# Patient Record
Sex: MALE | Race: WHITE | NOT HISPANIC OR LATINO
[De-identification: names, ages, dates, MRNs, and addresses within clinical notes are randomized per-mention and may not be internally consistent; named-entity substitution may affect disease eponyms.]

---

## 2019-01-10 ENCOUNTER — APPOINTMENT (OUTPATIENT)
Dept: FAMILY MEDICINE | Facility: CLINIC | Age: 59
End: 2019-01-10
Payer: SELF-PAY

## 2019-01-10 VITALS
SYSTOLIC BLOOD PRESSURE: 112 MMHG | BODY MASS INDEX: 26.21 KG/M2 | OXYGEN SATURATION: 98 % | HEIGHT: 67 IN | WEIGHT: 167 LBS | DIASTOLIC BLOOD PRESSURE: 78 MMHG | HEART RATE: 68 BPM

## 2019-01-10 DIAGNOSIS — Z02.4 ENCOUNTER FOR EXAMINATION FOR DRIVING LICENSE: ICD-10-CM

## 2019-01-10 PROBLEM — Z00.00 ENCOUNTER FOR PREVENTIVE HEALTH EXAMINATION: Status: ACTIVE | Noted: 2019-01-10

## 2019-01-10 LAB
BILIRUB UR QL STRIP: NEGATIVE
CLARITY UR: CLEAR
COLLECTION METHOD: NORMAL
GLUCOSE UR-MCNC: NEGATIVE
HCG UR QL: 0.2 EU/DL
HGB UR QL STRIP.AUTO: NEGATIVE
KETONES UR-MCNC: NEGATIVE
LEUKOCYTE ESTERASE UR QL STRIP: NEGATIVE
NITRITE UR QL STRIP: NEGATIVE
PH UR STRIP: 6.5
PROT UR STRIP-MCNC: NEGATIVE
SP GR UR STRIP: 1.01

## 2019-01-10 PROCEDURE — 99173 VISUAL ACUITY SCREEN: CPT

## 2019-01-10 PROCEDURE — 99499P DOT PHYSICAL: CUSTOM | Mod: 25

## 2019-01-10 PROCEDURE — 81003 URINALYSIS AUTO W/O SCOPE: CPT | Mod: QW

## 2019-01-10 PROCEDURE — 92551 PURE TONE HEARING TEST AIR: CPT

## 2020-11-11 ENCOUNTER — APPOINTMENT (OUTPATIENT)
Dept: POPULATION HEALTH | Facility: CLINIC | Age: 60
End: 2020-11-11
Payer: SELF-PAY

## 2020-11-11 PROCEDURE — 99499P DOT PHYSICAL: CUSTOM

## 2023-04-03 ENCOUNTER — APPOINTMENT (OUTPATIENT)
Dept: ORTHOPEDIC SURGERY | Facility: CLINIC | Age: 63
End: 2023-04-03
Payer: COMMERCIAL

## 2023-04-03 VITALS — WEIGHT: 177 LBS | HEIGHT: 67 IN | BODY MASS INDEX: 27.78 KG/M2

## 2023-04-03 DIAGNOSIS — Z78.9 OTHER SPECIFIED HEALTH STATUS: ICD-10-CM

## 2023-04-03 DIAGNOSIS — E78.00 PURE HYPERCHOLESTEROLEMIA, UNSPECIFIED: ICD-10-CM

## 2023-04-03 PROCEDURE — 99204 OFFICE O/P NEW MOD 45 MIN: CPT

## 2023-04-03 PROCEDURE — 73030 X-RAY EXAM OF SHOULDER: CPT | Mod: RT

## 2023-04-03 NOTE — HISTORY OF PRESENT ILLNESS
[de-identified] : This is a 64 y/o M presenting to the office with  right shoulder pain for a couple of years. Patient reports pain has been getting progressively worse. Denies any specific injury or trauma. Pain is now constant severe 6 /10. Denies any numbness or tingling. pain does improve with rest and ice. Pain is exacerbated with motion overhead. Pain exacerbates when he swings his arm. He has trouble sleeping because of pain in his shoulder blade. He drives a school bus.\par \par \par

## 2023-04-03 NOTE — PHYSICAL EXAM
[Right] : right shoulder [There are no fractures, subluxations or dislocations. No significant abnormalities are seen] : There are no fractures, subluxations or dislocations. No significant abnormalities are seen [Type 2 acromion] : Type 2 acromion [de-identified] : Constitutional: The general appearance of the patient is well developed, well nourished, no deformities and well groomed. Normal  \par \par Gait: Gait and function is as follows: normal gait.  \par \par Skin: Head and neck visualized skin is normal. Left upper extremity visualized skin is normal. Right upper extremity visualized skin is normal. Thoracic Skin of the thoracic spine shows visualized skin is normal.  \par \par Cardiovascular: palpable radial pulse bilaterally, good capillary refill in digits of the bilateral upper extremities and no temperature or color changes in the bilateral upper extremities.  \par \par Lymphatic: Normal Palpation of lymph nodes in the cervical.  \par \par Neurologic: fine motor control in the bilateral upper extremities is intact. Deep Tendon Reflexes in Upper and Lower Extremities Negative Bynum's in the bilateral upper extremities. The patient is oriented to time, place and person. Sensation to light touch intact in the bilateral upper extremities. Mood and Affect is normal.  \par \par Left Shoulder: Inspection of the shoulder/upper arm is as follows: There is a full, pain-free, stable range of motion of the shoulder with normal strength and no tenderness to palpation.   \par \par Right Shoulder: Inspection of the shoulder/upper arm is as follows: Stable shoulder. Palpation of the shoulder/upper arm is as follows: There is tenderness at the AC joint, proximal biceps tendon and supraspinatus tendon. Range of motion of the shoulder is as follows: Pain with internal rotation, external rotation, abduction and forward flexion. Strength of the shoulder is as follows: Supraspinatus 4/5. External Rotation 4/5. Internal Rotation 4/5. Infraspinatus 5/5 4/5. Deltoid 5/5 Ligament Stability and Special Tests of the shoulder is as follows: Neer test is positive. Pratt' test is positive. Speed's test is positive. \par \par Neck: Inspection / Palpation of the cervical spine is as follows: There is a mild restricted range of motion of the cervical spine. Increase tone and mild tenderness to palpation. Stable ROM of cervical spine.  \par \par Back, including spine: Inspection / Palpation of the thoracic/lumbar spine is as follows: There is a full, pain free, stable range of motion of the thoracic spine with a normal tone and not tenderness to palpation..  \par \par \par

## 2023-04-03 NOTE — DISCUSSION/SUMMARY
[de-identified] : 64 y/o M presents with right shoulder pain, that has been ongoing for several years.\par He does have significant pain in the anterior aspect of the shoulder especially with internal rotation.\par This point I am recommending MRI to evaluate for a full-thickness rotator cuff tear, particularly leading edge tear.\par If there is no full-thickness rotator cuff tear would consider injection to long head of biceps tendon versus glenohumeral joint.\par \par \par Based on the patient’s history and physical exam findings, I am concerned about the possibility of a full-thickness rotator cuff tear.  The patient has pain and subjective weakness consistent with this diagnosis.  Therefore, I recommend an MRI to evaluate for a rotator cuff tear.  This will also aid in evaluating for injury to the biceps labral complex and for any signs of impingement. The patient will follow-up after MRI to discuss further treatment options. \par  \par (1) We discussed a comprehensive treatment plans that included a prescription management plan involving the use of prescription strength medications to include Ibuprofen 600-800 mg TID, versus 500-650 mg Tylenol. We also discussed prescribing topical diclofenac (Voltaren gel) as well as once daily Meloxicam 15 mg. \par \par (2) The patient has More Than One chronic injuries/illnesses as outlined, discussed, and documented by ICD 10 codes listed, as well as the HPI and Plan section. \par \par There is a moderate risk of morbidity with further treatment, especially from use of prescription strength medications and possible side effects of these medications which include upset stomach and cardiac/renal issues with long term use were discussed. \par \par (3) I recommended that the patient follow-up with their medical physician to discuss any significant specific potential issues with long term use such as interactions with current medications or with exacerbation of underlying medical morbidities.\par \par Attestation:\par \par I, Sean Willams, attest that this documentation has been prepared under the direction and in the presence of Provider Marko Marshall MD.\par \par The documentation recorded by the scribe, in my presence, accurately reflects the service I personally performed, and the decisions made by me with my edits as appropriate.\par \par Marko Marshall MD\par

## 2023-04-04 ENCOUNTER — FORM ENCOUNTER (OUTPATIENT)
Age: 63
End: 2023-04-04

## 2023-04-05 ENCOUNTER — APPOINTMENT (OUTPATIENT)
Dept: MRI IMAGING | Facility: CLINIC | Age: 63
End: 2023-04-05
Payer: COMMERCIAL

## 2023-04-05 PROCEDURE — 73221 MRI JOINT UPR EXTREM W/O DYE: CPT | Mod: RT,MH

## 2023-04-17 ENCOUNTER — APPOINTMENT (OUTPATIENT)
Dept: ORTHOPEDIC SURGERY | Facility: CLINIC | Age: 63
End: 2023-04-17
Payer: MEDICARE

## 2023-04-17 DIAGNOSIS — M75.21 BICIPITAL TENDINITIS, RIGHT SHOULDER: ICD-10-CM

## 2023-04-17 DIAGNOSIS — S43.431A SUPERIOR GLENOID LABRUM LESION OF RIGHT SHOULDER, INITIAL ENCOUNTER: ICD-10-CM

## 2023-04-17 DIAGNOSIS — M25.511 PAIN IN RIGHT SHOULDER: ICD-10-CM

## 2023-04-17 DIAGNOSIS — M75.51 BURSITIS OF RIGHT SHOULDER: ICD-10-CM

## 2023-04-17 PROCEDURE — 99214 OFFICE O/P EST MOD 30 MIN: CPT

## 2023-04-17 NOTE — DATA REVIEWED
[FreeTextEntry1] : Rt Sh MRI [facility] 4/5/23:\par 1. Full thickness tearing of the supraspinatus tendon insertion measuring 1.5 cm with 1 cm of retraction \par anteriorly and moderate surrounding bursitis without muscle atrophy.\par 2. Mild partial tearing of the infraspinatus and subscapularis tendon insertions.\par 3. Moderate biceps tendinopathy and tenosynovitis with mild proximal medial subluxation.\par 4. Tearing at the junction of the superior and anterior labrum with mild effusion, synovitis and capsulitis.\par 5. AC joint arthrosis and lateral acromial bone spurs.

## 2023-04-17 NOTE — PHYSICAL EXAM
[de-identified] : Constitutional: The general appearance of the patient is well developed, well nourished, no deformities and well groomed. Normal  \par \par Gait: Gait and function is as follows: normal gait.  \par \par Skin: Head and neck visualized skin is normal. Left upper extremity visualized skin is normal. Right upper extremity visualized skin is normal. Thoracic Skin of the thoracic spine shows visualized skin is normal.  \par \par Cardiovascular: palpable radial pulse bilaterally, good capillary refill in digits of the bilateral upper extremities and no temperature or color changes in the bilateral upper extremities.  \par \par Lymphatic: Normal Palpation of lymph nodes in the cervical.  \par \par Neurologic: fine motor control in the bilateral upper extremities is intact. Deep Tendon Reflexes in Upper and Lower Extremities Negative Bynum's in the bilateral upper extremities. The patient is oriented to time, place and person. Sensation to light touch intact in the bilateral upper extremities. Mood and Affect is normal.  \par \par Left Shoulder: Inspection of the shoulder/upper arm is as follows: There is a full, pain-free, stable range of motion of the shoulder with normal strength and no tenderness to palpation.   \par \par Right Shoulder: Inspection of the shoulder/upper arm is as follows: Stable shoulder. Palpation of the shoulder/upper arm is as follows: There is tenderness at the AC joint, proximal biceps tendon and supraspinatus tendon. Range of motion of the shoulder is as follows: Pain with internal rotation, external rotation, abduction and forward flexion. Strength of the shoulder is as follows: Supraspinatus 4/5. External Rotation 4/5. Internal Rotation 4/5. Infraspinatus 5/5 4/5. Deltoid 5/5 Ligament Stability and Special Tests of the shoulder is as follows: Neer test is positive. Pratt' test is positive. Speed's test is positive. \par \par Neck: Inspection / Palpation of the cervical spine is as follows: There is a mild restricted range of motion of the cervical spine. Increase tone and mild tenderness to palpation. Stable ROM of cervical spine.  \par \par Back, including spine: Inspection / Palpation of the thoracic/lumbar spine is as follows: There is a full, pain free, stable range of motion of the thoracic spine with a normal tone and not tenderness to palpation..  \par \par \par

## 2023-04-17 NOTE — DISCUSSION/SUMMARY
[de-identified] : 64 y/o M presents with right shoulder pain, that has been ongoing for several years.\par MRI of the right shoulder reveals full thickness rotator cuff tear 1.5 cm x 1cm\par At this point patient is not interested in surgical treatment, as he wants to play golf and tennis in the next few weeks.\par \par I did explain to the patient that he should try these activities and if he is able to complete them with minimal pain then he can delay surgery.  If he does have pain I recommended expedited surgery.  I did explain that we could lose the window for surgical repair if he chooses to wait too long.\par \par If he waits greater than 6 to 8 months that I would consider repeat MRI before surgical intervention.\par \par \par \par \par (1) We discussed a comprehensive treatment plans that included a prescription management plan involving the use of prescription strength medications to include Ibuprofen 600-800 mg TID, versus 500-650 mg Tylenol. We also discussed prescribing topical diclofenac (Voltaren gel) as well as once daily Meloxicam 15 mg. \par \par (2) The patient has More Than One chronic injuries/illnesses as outlined, discussed, and documented by ICD 10 codes listed, as well as the HPI and Plan section. \par \par There is a moderate risk of morbidity with further treatment, especially from use of prescription strength medications and possible side effects of these medications which include upset stomach and cardiac/renal issues with long term use were discussed. \par \par (3) I recommended that the patient follow-up with their medical physician to discuss any significant specific potential issues with long term use such as interactions with current medications or with exacerbation of underlying medical morbidities.\par \par Attestation:\par \par I, Sean Willams, attest that this documentation has been prepared under the direction and in the presence of Provider Marko Marshall MD.\par \par The documentation recorded by the scribe, in my presence, accurately reflects the service I personally performed, and the decisions made by me with my edits as appropriate.\par \par Marko Marshall MD\par

## 2023-11-25 NOTE — HISTORY OF PRESENT ILLNESS
[de-identified] : This is a 64 y/o M presenting to the office with  right shoulder pain for a couple of years. Patient reports pain has been getting progressively worse. Denies any specific injury or trauma. Pain is now constant severe 6 /10. Denies any numbness or tingling. pain does improve with rest and ice. Pain is exacerbated with motion overhead. Pain exacerbates when he swings his arm. He has trouble sleeping because of pain in his shoulder blade. He drives a school bus.\par \par 4/17/23 Pt returns for follow up of right shoulder. He is here to review his MRI. Pt reports pain has improved from last time. Although he reports some pain, it hasn't prevented him from ADLs.
No

## 2024-08-26 ENCOUNTER — APPOINTMENT (OUTPATIENT)
Dept: ORTHOPEDIC SURGERY | Facility: CLINIC | Age: 64
End: 2024-08-26
Payer: MEDICARE

## 2024-08-26 DIAGNOSIS — M75.51 BURSITIS OF RIGHT SHOULDER: ICD-10-CM

## 2024-08-26 DIAGNOSIS — M75.21 BICIPITAL TENDINITIS, RIGHT SHOULDER: ICD-10-CM

## 2024-08-26 DIAGNOSIS — M25.511 PAIN IN RIGHT SHOULDER: ICD-10-CM

## 2024-08-26 DIAGNOSIS — M75.121 COMPLETE ROTATOR CUFF TEAR OR RUPTURE OF RIGHT SHOULDER, NOT SPECIFIED AS TRAUMATIC: ICD-10-CM

## 2024-08-26 PROCEDURE — 99214 OFFICE O/P EST MOD 30 MIN: CPT

## 2024-08-26 PROCEDURE — 99204 OFFICE O/P NEW MOD 45 MIN: CPT

## 2024-08-26 PROCEDURE — 73030 X-RAY EXAM OF SHOULDER: CPT | Mod: RT

## 2024-08-26 NOTE — HISTORY OF PRESENT ILLNESS
[de-identified] : This is a 62 y/o M presenting to the office with  right shoulder pain for a couple of years. Patient reports pain has been getting progressively worse. Denies any specific injury or trauma. Pain is now constant severe 6 /10. Denies any numbness or tingling. pain does improve with rest and ice. Pain is exacerbated with motion overhead. Pain exacerbates when he swings his arm. He has trouble sleeping because of pain in his shoulder blade. 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 He is here to review his MRI. Pt reports pain has improved from last time. Although he reports some pain, it hasn't prevented him from ADLs.  08/26/24: Patient returns for a follow up of a right shoulder. Patient has known small full-thickness rotator cuff tear.  He has been completing home exercise program.  He reports he has stopped playing tennis secondary to his pain. Interested in discussing further options

## 2024-08-26 NOTE — HISTORY OF PRESENT ILLNESS
[de-identified] : This is a 64 y/o M presenting to the office with  right shoulder pain for a couple of years. Patient reports pain has been getting progressively worse. Denies any specific injury or trauma. Pain is now constant severe 6 /10. Denies any numbness or tingling. pain does improve with rest and ice. Pain is exacerbated with motion overhead. Pain exacerbates when he swings his arm. He has trouble sleeping because of pain in his shoulder blade. 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 He is here to review his MRI. Pt reports pain has improved from last time. Although he reports some pain, it hasn't prevented him from ADLs.  08/26/24: Patient returns for a follow up of a right shoulder. Patient has known small full-thickness rotator cuff tear.  He has been completing home exercise program.  He reports he has stopped playing tennis secondary to his pain. Interested in discussing further options

## 2024-08-26 NOTE — DISCUSSION/SUMMARY
[de-identified] : 63 y/o M presents with right shoulder pain, that has been ongoing for several years. Previous MRI of the right shoulder from 04/2023 reveals full thickness rotator cuff tear 1.5 cm x 1cm Patient has been completing home exercise program and has been avoiding strenuous activity. At this stage we discussed given his overall activity level he would be a candidate for arthroscopic rotator cuff repair and bicep tenodesis.. Recommended updated MRI to rule out further progression/retraction of rotator cuff tear and pre-surgical planning.    The patient is interested in pursuing surgical treatment considering he has failed all conservative management.    We had a lengthy discussion about the nature of arthroscopic rotator cuff repair surgery including the likelihood of addressing other pathology with arthroscopic vs mini-open biceps tenodesis, subacromial decompression, and extensive debridement of any pathologic tissue encountered at the time of surgery.   I discussed the risks and benefits of both operative and non-operative treatment. I explained in detail the postoperative recovery including the duration of sling use and expectation for postoperative physical therapy. Explained that there is no guarantee however there is a high likelihood of functional improvement and pain relief. The patient understood all this was discussed.   The patient is indicated for a Right shoulder arthroscopic rotator cuff repair, biceps tenodesis and subacromial decompression.   * Surgery: Considering patient has failed all conservative treatment we are requesting authorization for:  - Right shoulder arthroscopic rotator cuff repair (CPT code 96797), mini open biceps biceps tenodesis (CPT 38268), debridement (CPT 79151) Subacromial decompression (99052)   Pt would like surgery (December/January)   The patient will require a [place] Arc 2.0 brace, cryotherapy, and 12 weeks of post-operative physical therapy.  The patient will require a medical clearance  The doctor will require the [facility] representative, one hour, and one assistant.   (1) We discussed a comprehensive treatment plans that included a prescription management plan involving the use of prescription strength medications to include Ibuprofen 600-800 mg TID, versus 500-650 mg Tylenol. We also discussed prescribing topical diclofenac (Voltaren gel) as well as once daily Meloxicam 15 mg.  (2) The patient has More Than One chronic injuries/illnesses as outlined, discussed, and documented by ICD 10 codes listed, as well as the HPI and Plan section.  There is a moderate risk of morbidity with further treatment, especially from use of prescription strength medications and possible side effects of these medications which include upset stomach and cardiac/renal issues with long term use were discussed.  (3) I recommended that the patient follow-up with their medical physician to discuss any significant specific potential issues with long term use such as interactions with current medications or with exacerbation of underlying medical morbidities.  Attestation: I, Tori Cary, attest that this documentation has been prepared under the direction and in the presence of Provider Marko Marshall MD. The documentation recorded by the scribe, in my presence, accurately reflects the service I personally performed, and the decisions made by me with my edits as appropriate. Marko Marshall MD

## 2024-08-26 NOTE — PHYSICAL EXAM
[de-identified] : Constitutional: The general appearance of the patient is well developed, well nourished, no deformities and well groomed. Normal  \par  \par  Gait: Gait and function is as follows: normal gait.  \par  \par  Skin: Head and neck visualized skin is normal. Left upper extremity visualized skin is normal. Right upper extremity visualized skin is normal. Thoracic Skin of the thoracic spine shows visualized skin is normal.  \par  \par  Cardiovascular: palpable radial pulse bilaterally, good capillary refill in digits of the bilateral upper extremities and no temperature or color changes in the bilateral upper extremities.  \par  \par  Lymphatic: Normal Palpation of lymph nodes in the cervical.  \par  \par  Neurologic: fine motor control in the bilateral upper extremities is intact. Deep Tendon Reflexes in Upper and Lower Extremities Negative Bynum's in the bilateral upper extremities. The patient is oriented to time, place and person. Sensation to light touch intact in the bilateral upper extremities. Mood and Affect is normal.  \par  \par  Left Shoulder: Inspection of the shoulder/upper arm is as follows: There is a full, pain-free, stable range of motion of the shoulder with normal strength and no tenderness to palpation.   \par  \par  Right Shoulder: Inspection of the shoulder/upper arm is as follows: Stable shoulder. Palpation of the shoulder/upper arm is as follows: There is tenderness at the AC joint, proximal biceps tendon and supraspinatus tendon. Range of motion of the shoulder is as follows: Pain with internal rotation, external rotation, abduction and forward flexion. Strength of the shoulder is as follows: Supraspinatus 4/5. External Rotation 4/5. Internal Rotation 4/5. Infraspinatus 5/5 4/5. Deltoid 5/5 Ligament Stability and Special Tests of the shoulder is as follows: Neer test is positive. Pratt' test is positive. Speed's test is positive. \par  \par  Neck: Inspection / Palpation of the cervical spine is as follows: There is a mild restricted range of motion of the cervical spine. Increase tone and mild tenderness to palpation. Stable ROM of cervical spine.  \par  \par  Back, including spine: Inspection / Palpation of the thoracic/lumbar spine is as follows: There is a full, pain free, stable range of motion of the thoracic spine with a normal tone and not tenderness to palpation..  \par  \par  \par   [Right] : right shoulder [There are no fractures, subluxations or dislocations. No significant abnormalities are seen] : There are no fractures, subluxations or dislocations. No significant abnormalities are seen [Type 2 acromion] : Type 2 acromion

## 2024-08-26 NOTE — DISCUSSION/SUMMARY
[de-identified] : 65 y/o M presents with right shoulder pain, that has been ongoing for several years. Previous MRI of the right shoulder from 04/2023 reveals full thickness rotator cuff tear 1.5 cm x 1cm Patient has been completing home exercise program and has been avoiding strenuous activity. At this stage we discussed given his overall activity level he would be a candidate for arthroscopic rotator cuff repair and bicep tenodesis.. Recommended updated MRI to rule out further progression/retraction of rotator cuff tear and pre-surgical planning.    The patient is interested in pursuing surgical treatment considering he has failed all conservative management.    We had a lengthy discussion about the nature of arthroscopic rotator cuff repair surgery including the likelihood of addressing other pathology with arthroscopic vs mini-open biceps tenodesis, subacromial decompression, and extensive debridement of any pathologic tissue encountered at the time of surgery.   I discussed the risks and benefits of both operative and non-operative treatment. I explained in detail the postoperative recovery including the duration of sling use and expectation for postoperative physical therapy. Explained that there is no guarantee however there is a high likelihood of functional improvement and pain relief. The patient understood all this was discussed.   The patient is indicated for a Right shoulder arthroscopic rotator cuff repair, biceps tenodesis and subacromial decompression.   * Surgery: Considering patient has failed all conservative treatment we are requesting authorization for:  - Right shoulder arthroscopic rotator cuff repair (CPT code 59736), mini open biceps biceps tenodesis (CPT 69291), debridement (CPT 76888) Subacromial decompression (51052)   Pt would like surgery (December/January)   The patient will require a [place] Arc 2.0 brace, cryotherapy, and 12 weeks of post-operative physical therapy.  The patient will require a medical clearance  The doctor will require the [facility] representative, one hour, and one assistant.   (1) We discussed a comprehensive treatment plans that included a prescription management plan involving the use of prescription strength medications to include Ibuprofen 600-800 mg TID, versus 500-650 mg Tylenol. We also discussed prescribing topical diclofenac (Voltaren gel) as well as once daily Meloxicam 15 mg.  (2) The patient has More Than One chronic injuries/illnesses as outlined, discussed, and documented by ICD 10 codes listed, as well as the HPI and Plan section.  There is a moderate risk of morbidity with further treatment, especially from use of prescription strength medications and possible side effects of these medications which include upset stomach and cardiac/renal issues with long term use were discussed.  (3) I recommended that the patient follow-up with their medical physician to discuss any significant specific potential issues with long term use such as interactions with current medications or with exacerbation of underlying medical morbidities.  Attestation: I, Toir Cary, attest that this documentation has been prepared under the direction and in the presence of Provider Marko Marshall MD. The documentation recorded by the scribe, in my presence, accurately reflects the service I personally performed, and the decisions made by me with my edits as appropriate. Marko Marshall MD

## 2024-08-26 NOTE — PHYSICAL EXAM
[de-identified] : Constitutional: The general appearance of the patient is well developed, well nourished, no deformities and well groomed. Normal  \par  \par  Gait: Gait and function is as follows: normal gait.  \par  \par  Skin: Head and neck visualized skin is normal. Left upper extremity visualized skin is normal. Right upper extremity visualized skin is normal. Thoracic Skin of the thoracic spine shows visualized skin is normal.  \par  \par  Cardiovascular: palpable radial pulse bilaterally, good capillary refill in digits of the bilateral upper extremities and no temperature or color changes in the bilateral upper extremities.  \par  \par  Lymphatic: Normal Palpation of lymph nodes in the cervical.  \par  \par  Neurologic: fine motor control in the bilateral upper extremities is intact. Deep Tendon Reflexes in Upper and Lower Extremities Negative Bynum's in the bilateral upper extremities. The patient is oriented to time, place and person. Sensation to light touch intact in the bilateral upper extremities. Mood and Affect is normal.  \par  \par  Left Shoulder: Inspection of the shoulder/upper arm is as follows: There is a full, pain-free, stable range of motion of the shoulder with normal strength and no tenderness to palpation.   \par  \par  Right Shoulder: Inspection of the shoulder/upper arm is as follows: Stable shoulder. Palpation of the shoulder/upper arm is as follows: There is tenderness at the AC joint, proximal biceps tendon and supraspinatus tendon. Range of motion of the shoulder is as follows: Pain with internal rotation, external rotation, abduction and forward flexion. Strength of the shoulder is as follows: Supraspinatus 4/5. External Rotation 4/5. Internal Rotation 4/5. Infraspinatus 5/5 4/5. Deltoid 5/5 Ligament Stability and Special Tests of the shoulder is as follows: Neer test is positive. Pratt' test is positive. Speed's test is positive. \par  \par  Neck: Inspection / Palpation of the cervical spine is as follows: There is a mild restricted range of motion of the cervical spine. Increase tone and mild tenderness to palpation. Stable ROM of cervical spine.  \par  \par  Back, including spine: Inspection / Palpation of the thoracic/lumbar spine is as follows: There is a full, pain free, stable range of motion of the thoracic spine with a normal tone and not tenderness to palpation..  \par  \par  \par   [Right] : right shoulder [There are no fractures, subluxations or dislocations. No significant abnormalities are seen] : There are no fractures, subluxations or dislocations. No significant abnormalities are seen [Type 2 acromion] : Type 2 acromion

## 2024-10-09 ENCOUNTER — NON-APPOINTMENT (OUTPATIENT)
Age: 64
End: 2024-10-09

## 2024-11-14 ENCOUNTER — APPOINTMENT (OUTPATIENT)
Dept: MRI IMAGING | Facility: CLINIC | Age: 64
End: 2024-11-14
Payer: MEDICARE

## 2024-11-14 PROCEDURE — 73221 MRI JOINT UPR EXTREM W/O DYE: CPT | Mod: RT,MH

## 2024-11-18 ENCOUNTER — APPOINTMENT (OUTPATIENT)
Dept: ORTHOPEDIC SURGERY | Facility: CLINIC | Age: 64
End: 2024-11-18
Payer: MEDICARE

## 2024-11-18 DIAGNOSIS — M75.51 BURSITIS OF RIGHT SHOULDER: ICD-10-CM

## 2024-11-18 DIAGNOSIS — S43.431A SUPERIOR GLENOID LABRUM LESION OF RIGHT SHOULDER, INITIAL ENCOUNTER: ICD-10-CM

## 2024-11-18 DIAGNOSIS — M75.121 COMPLETE ROTATOR CUFF TEAR OR RUPTURE OF RIGHT SHOULDER, NOT SPECIFIED AS TRAUMATIC: ICD-10-CM

## 2024-11-18 DIAGNOSIS — M75.21 BICIPITAL TENDINITIS, RIGHT SHOULDER: ICD-10-CM

## 2024-11-18 DIAGNOSIS — M25.511 PAIN IN RIGHT SHOULDER: ICD-10-CM

## 2024-11-18 PROCEDURE — 99214 OFFICE O/P EST MOD 30 MIN: CPT

## 2025-01-17 ENCOUNTER — APPOINTMENT (OUTPATIENT)
Dept: ORTHOPEDIC SURGERY | Facility: CLINIC | Age: 65
End: 2025-01-17
Payer: MEDICARE

## 2025-01-17 DIAGNOSIS — M75.51 BURSITIS OF RIGHT SHOULDER: ICD-10-CM

## 2025-01-17 DIAGNOSIS — M75.21 BICIPITAL TENDINITIS, RIGHT SHOULDER: ICD-10-CM

## 2025-01-17 DIAGNOSIS — M75.121 COMPLETE ROTATOR CUFF TEAR OR RUPTURE OF RIGHT SHOULDER, NOT SPECIFIED AS TRAUMATIC: ICD-10-CM

## 2025-01-17 DIAGNOSIS — S43.431A SUPERIOR GLENOID LABRUM LESION OF RIGHT SHOULDER, INITIAL ENCOUNTER: ICD-10-CM

## 2025-01-17 DIAGNOSIS — M25.511 PAIN IN RIGHT SHOULDER: ICD-10-CM

## 2025-01-17 PROCEDURE — 99214 OFFICE O/P EST MOD 30 MIN: CPT

## 2025-01-17 PROCEDURE — L3960: CPT

## 2025-01-17 RX ORDER — OXYCODONE 5 MG/1
5 TABLET ORAL
Qty: 30 | Refills: 0 | Status: ACTIVE | COMMUNITY
Start: 2025-01-17 | End: 1900-01-01

## 2025-02-04 ENCOUNTER — APPOINTMENT (OUTPATIENT)
Dept: ORTHOPEDIC SURGERY | Facility: HOSPITAL | Age: 65
End: 2025-02-04
Payer: MEDICARE

## 2025-02-04 PROCEDURE — 29827 SHO ARTHRS SRG RT8TR CUF RPR: CPT | Mod: AS,RT

## 2025-02-04 PROCEDURE — 29827 SHO ARTHRS SRG RT8TR CUF RPR: CPT | Mod: RT

## 2025-02-04 PROCEDURE — 29823 SHO ARTHRS SRG XTNSV DBRDMT: CPT | Mod: AS,RT

## 2025-02-04 PROCEDURE — 29826 SHO ARTHRS SRG DECOMPRESSION: CPT | Mod: AS,RT

## 2025-02-04 PROCEDURE — 29821 SHO ARTHRS SRG COMPL SYNVCT: CPT | Mod: 59,RT

## 2025-02-04 PROCEDURE — 23430 REPAIR BICEPS TENDON: CPT | Mod: 59,LT

## 2025-02-04 PROCEDURE — 29821 SHO ARTHRS SRG COMPL SYNVCT: CPT | Mod: AS,59,RT

## 2025-02-04 PROCEDURE — 23430 REPAIR BICEPS TENDON: CPT | Mod: AS,59,RT

## 2025-02-04 PROCEDURE — 29826 SHO ARTHRS SRG DECOMPRESSION: CPT | Mod: RT

## 2025-02-04 PROCEDURE — 29823 SHO ARTHRS SRG XTNSV DBRDMT: CPT | Mod: 59,RT

## 2025-02-14 ENCOUNTER — APPOINTMENT (OUTPATIENT)
Dept: ORTHOPEDIC SURGERY | Facility: CLINIC | Age: 65
End: 2025-02-14
Payer: MEDICARE

## 2025-02-14 DIAGNOSIS — M75.21 BICIPITAL TENDINITIS, RIGHT SHOULDER: ICD-10-CM

## 2025-02-14 DIAGNOSIS — M75.121 COMPLETE ROTATOR CUFF TEAR OR RUPTURE OF RIGHT SHOULDER, NOT SPECIFIED AS TRAUMATIC: ICD-10-CM

## 2025-02-14 DIAGNOSIS — M25.511 PAIN IN RIGHT SHOULDER: ICD-10-CM

## 2025-02-14 DIAGNOSIS — M75.51 BURSITIS OF RIGHT SHOULDER: ICD-10-CM

## 2025-02-14 PROCEDURE — 99024 POSTOP FOLLOW-UP VISIT: CPT

## 2025-03-14 ENCOUNTER — APPOINTMENT (OUTPATIENT)
Dept: ORTHOPEDIC SURGERY | Facility: CLINIC | Age: 65
End: 2025-03-14

## 2025-03-14 ENCOUNTER — APPOINTMENT (OUTPATIENT)
Dept: ORTHOPEDIC SURGERY | Facility: CLINIC | Age: 65
End: 2025-03-14
Payer: MEDICARE

## 2025-03-14 DIAGNOSIS — M75.51 BURSITIS OF RIGHT SHOULDER: ICD-10-CM

## 2025-03-14 DIAGNOSIS — M75.21 BICIPITAL TENDINITIS, RIGHT SHOULDER: ICD-10-CM

## 2025-03-14 DIAGNOSIS — S43.431A SUPERIOR GLENOID LABRUM LESION OF RIGHT SHOULDER, INITIAL ENCOUNTER: ICD-10-CM

## 2025-03-14 DIAGNOSIS — M75.121 COMPLETE ROTATOR CUFF TEAR OR RUPTURE OF RIGHT SHOULDER, NOT SPECIFIED AS TRAUMATIC: ICD-10-CM

## 2025-03-14 DIAGNOSIS — M25.511 PAIN IN RIGHT SHOULDER: ICD-10-CM

## 2025-03-14 PROCEDURE — 99024 POSTOP FOLLOW-UP VISIT: CPT

## 2025-04-11 ENCOUNTER — APPOINTMENT (OUTPATIENT)
Dept: ORTHOPEDIC SURGERY | Facility: CLINIC | Age: 65
End: 2025-04-11
Payer: MEDICARE

## 2025-04-11 DIAGNOSIS — M75.21 BICIPITAL TENDINITIS, RIGHT SHOULDER: ICD-10-CM

## 2025-04-11 DIAGNOSIS — M25.511 PAIN IN RIGHT SHOULDER: ICD-10-CM

## 2025-04-11 DIAGNOSIS — S43.431A SUPERIOR GLENOID LABRUM LESION OF RIGHT SHOULDER, INITIAL ENCOUNTER: ICD-10-CM

## 2025-04-11 DIAGNOSIS — M75.51 BURSITIS OF RIGHT SHOULDER: ICD-10-CM

## 2025-04-11 PROCEDURE — 99024 POSTOP FOLLOW-UP VISIT: CPT

## 2025-05-12 ENCOUNTER — APPOINTMENT (OUTPATIENT)
Dept: ORTHOPEDIC SURGERY | Facility: CLINIC | Age: 65
End: 2025-05-12
Payer: MEDICARE

## 2025-05-12 DIAGNOSIS — M75.121 COMPLETE ROTATOR CUFF TEAR OR RUPTURE OF RIGHT SHOULDER, NOT SPECIFIED AS TRAUMATIC: ICD-10-CM

## 2025-05-12 DIAGNOSIS — M25.511 PAIN IN RIGHT SHOULDER: ICD-10-CM

## 2025-05-12 DIAGNOSIS — S43.431A SUPERIOR GLENOID LABRUM LESION OF RIGHT SHOULDER, INITIAL ENCOUNTER: ICD-10-CM

## 2025-05-12 DIAGNOSIS — M75.21 BICIPITAL TENDINITIS, RIGHT SHOULDER: ICD-10-CM

## 2025-05-12 DIAGNOSIS — M75.51 BURSITIS OF RIGHT SHOULDER: ICD-10-CM

## 2025-05-12 PROCEDURE — 99214 OFFICE O/P EST MOD 30 MIN: CPT

## 2025-05-12 RX ORDER — MELOXICAM 15 MG/1
15 TABLET ORAL
Qty: 30 | Refills: 0 | Status: ACTIVE | COMMUNITY
Start: 2025-05-12 | End: 1900-01-01

## 2025-06-16 ENCOUNTER — APPOINTMENT (OUTPATIENT)
Dept: ORTHOPEDIC SURGERY | Facility: CLINIC | Age: 65
End: 2025-06-16
Payer: MEDICARE

## 2025-06-16 PROCEDURE — 99214 OFFICE O/P EST MOD 30 MIN: CPT

## 2025-06-16 RX ORDER — MELOXICAM 15 MG/1
15 TABLET ORAL
Qty: 30 | Refills: 1 | Status: ACTIVE | COMMUNITY
Start: 2025-06-16 | End: 1900-01-01

## 2025-07-21 ENCOUNTER — APPOINTMENT (OUTPATIENT)
Dept: ORTHOPEDIC SURGERY | Facility: CLINIC | Age: 65
End: 2025-07-21
Payer: MEDICARE

## 2025-07-21 DIAGNOSIS — M25.511 PAIN IN RIGHT SHOULDER: ICD-10-CM

## 2025-07-21 DIAGNOSIS — M75.51 BURSITIS OF RIGHT SHOULDER: ICD-10-CM

## 2025-07-21 DIAGNOSIS — S43.431A SUPERIOR GLENOID LABRUM LESION OF RIGHT SHOULDER, INITIAL ENCOUNTER: ICD-10-CM

## 2025-07-21 DIAGNOSIS — M75.21 BICIPITAL TENDINITIS, RIGHT SHOULDER: ICD-10-CM

## 2025-07-21 PROCEDURE — 99214 OFFICE O/P EST MOD 30 MIN: CPT

## 2025-08-25 ENCOUNTER — NON-APPOINTMENT (OUTPATIENT)
Age: 65
End: 2025-08-25

## 2025-09-15 ENCOUNTER — APPOINTMENT (OUTPATIENT)
Dept: ORTHOPEDIC SURGERY | Facility: CLINIC | Age: 65
End: 2025-09-15
Payer: MEDICARE

## 2025-09-15 DIAGNOSIS — S43.431A SUPERIOR GLENOID LABRUM LESION OF RIGHT SHOULDER, INITIAL ENCOUNTER: ICD-10-CM

## 2025-09-15 DIAGNOSIS — M75.121 COMPLETE ROTATOR CUFF TEAR OR RUPTURE OF RIGHT SHOULDER, NOT SPECIFIED AS TRAUMATIC: ICD-10-CM

## 2025-09-15 DIAGNOSIS — M25.511 PAIN IN RIGHT SHOULDER: ICD-10-CM

## 2025-09-15 DIAGNOSIS — M75.51 BURSITIS OF RIGHT SHOULDER: ICD-10-CM

## 2025-09-15 PROCEDURE — 99214 OFFICE O/P EST MOD 30 MIN: CPT
